# Patient Record
Sex: MALE | Race: WHITE | NOT HISPANIC OR LATINO | Employment: OTHER | URBAN - METROPOLITAN AREA
[De-identification: names, ages, dates, MRNs, and addresses within clinical notes are randomized per-mention and may not be internally consistent; named-entity substitution may affect disease eponyms.]

---

## 2021-05-14 ENCOUNTER — NEW PATIENT COMPREHENSIVE (OUTPATIENT)
Dept: URBAN - METROPOLITAN AREA CLINIC 53 | Facility: CLINIC | Age: 71
End: 2021-05-14

## 2021-05-14 DIAGNOSIS — H02.831: ICD-10-CM

## 2021-05-14 DIAGNOSIS — H25.13: ICD-10-CM

## 2021-05-14 DIAGNOSIS — H02.834: ICD-10-CM

## 2021-05-14 DIAGNOSIS — H52.4: ICD-10-CM

## 2021-05-14 DIAGNOSIS — H35.371: ICD-10-CM

## 2021-05-14 DIAGNOSIS — H43.12: ICD-10-CM

## 2021-05-14 DIAGNOSIS — H43.812: ICD-10-CM

## 2021-05-14 PROCEDURE — 92015 DETERMINE REFRACTIVE STATE: CPT

## 2021-05-14 PROCEDURE — 92004 COMPRE OPH EXAM NEW PT 1/>: CPT

## 2021-05-14 ASSESSMENT — KERATOMETRY
OD_K2POWER_DIOPTERS: 42.25
OD_K1POWER_DIOPTERS: 43.00
OS_K1POWER_DIOPTERS: 43.00
OS_AXISANGLE2_DEGREES: 178
OS_AXISANGLE_DEGREES: 88
OD_AXISANGLE_DEGREES: 75
OS_K2POWER_DIOPTERS: 42.25
OD_AXISANGLE2_DEGREES: 165

## 2021-05-14 ASSESSMENT — VISUAL ACUITY
OS_CC: 20/25-1
OU_CC: J1+ @ 16"
OD_SC: 20/150
OS_SC: 20/200
OD_CC: 20/25-2

## 2021-05-14 ASSESSMENT — TONOMETRY
OS_IOP_MMHG: 18
OD_IOP_MMHG: 18

## 2021-05-14 NOTE — PATIENT DISCUSSION
Posterior vitreous detachment diagnosis was discussed with the patient. Scleral depression performed today. No retinal tears or holes were seen and this was explained to the patient. Retinal detachment warning signs including more floaters, flashing lights, or a curtain coming over their field of vision were told to the patient.  Will refer patient over to [facility] for further evaluation. [facility]

## 2021-05-14 NOTE — PATIENT DISCUSSION
The patient has a small vitreous hemorrhage. I am referring the patient to [facility] for further evaluation.

## 2022-05-12 ENCOUNTER — COMPREHENSIVE EXAM (OUTPATIENT)
Dept: URBAN - METROPOLITAN AREA CLINIC 50 | Facility: CLINIC | Age: 72
End: 2022-05-12

## 2022-05-12 DIAGNOSIS — H35.371: ICD-10-CM

## 2022-05-12 DIAGNOSIS — H52.4: ICD-10-CM

## 2022-05-12 DIAGNOSIS — H43.392: ICD-10-CM

## 2022-05-12 PROCEDURE — 92134 CPTRZ OPH DX IMG PST SGM RTA: CPT

## 2022-05-12 PROCEDURE — 92014 COMPRE OPH EXAM EST PT 1/>: CPT

## 2022-05-12 PROCEDURE — 92015 DETERMINE REFRACTIVE STATE: CPT

## 2022-05-12 ASSESSMENT — VISUAL ACUITY
OD_CC: J1+
OU_CC: 20/20
OU_CC: J1+
OS_GLARE: 20/30
OS_GLARE: 20/25
OD_CC: 20/20
OD_GLARE: 20/25
OD_GLARE: 20/20
OS_CC: J1+
OS_CC: 20/25

## 2022-05-12 ASSESSMENT — KERATOMETRY
OS_K2POWER_DIOPTERS: 42.25
OD_AXISANGLE2_DEGREES: 165
OS_K1POWER_DIOPTERS: 43.00
OS_AXISANGLE2_DEGREES: 178
OD_K2POWER_DIOPTERS: 42.25
OS_AXISANGLE_DEGREES: 88
OD_AXISANGLE_DEGREES: 75
OD_K1POWER_DIOPTERS: 43.00

## 2022-05-12 ASSESSMENT — TONOMETRY
OD_IOP_MMHG: 15
OS_IOP_MMHG: 15

## 2023-06-08 ENCOUNTER — COMPREHENSIVE EXAM (OUTPATIENT)
Dept: URBAN - METROPOLITAN AREA CLINIC 50 | Facility: CLINIC | Age: 73
End: 2023-06-08

## 2023-06-08 DIAGNOSIS — H02.831: ICD-10-CM

## 2023-06-08 DIAGNOSIS — H43.392: ICD-10-CM

## 2023-06-08 DIAGNOSIS — H35.371: ICD-10-CM

## 2023-06-08 DIAGNOSIS — H02.834: ICD-10-CM

## 2023-06-08 DIAGNOSIS — H43.812: ICD-10-CM

## 2023-06-08 DIAGNOSIS — H25.13: ICD-10-CM

## 2023-06-08 PROCEDURE — 92134 CPTRZ OPH DX IMG PST SGM RTA: CPT

## 2023-06-08 PROCEDURE — 92014 COMPRE OPH EXAM EST PT 1/>: CPT

## 2023-06-08 ASSESSMENT — VISUAL ACUITY
OD_GLARE: 20/25
OU_CC: J1+
OU_CC: 20/20-1
OS_GLARE: 20/25
OD_GLARE: 20/25
OD_CC: 20/20-1
OS_CC: 20/20-1
OS_GLARE: 20/25

## 2023-06-08 ASSESSMENT — KERATOMETRY
OS_AXISANGLE_DEGREES: 88
OD_AXISANGLE_DEGREES: 168
OS_K1POWER_DIOPTERS: 43.00
OD_K2POWER_DIOPTERS: 43.25
OS_K1POWER_DIOPTERS: 42.50
OD_K2POWER_DIOPTERS: 42.25
OD_K1POWER_DIOPTERS: 42.25
OS_AXISANGLE2_DEGREES: 86
OS_K2POWER_DIOPTERS: 42.25
OS_AXISANGLE_DEGREES: 176
OS_K2POWER_DIOPTERS: 43.00
OD_AXISANGLE2_DEGREES: 165
OS_AXISANGLE2_DEGREES: 178
OD_K1POWER_DIOPTERS: 43.00
OD_AXISANGLE_DEGREES: 75
OD_AXISANGLE2_DEGREES: 78

## 2023-06-08 ASSESSMENT — TONOMETRY
OS_IOP_MMHG: 12
OD_IOP_MMHG: 12

## 2024-07-17 ENCOUNTER — COMPREHENSIVE EXAM (OUTPATIENT)
Dept: URBAN - METROPOLITAN AREA CLINIC 50 | Facility: LOCATION | Age: 74
End: 2024-07-17

## 2024-07-17 DIAGNOSIS — H25.13: ICD-10-CM

## 2024-07-17 DIAGNOSIS — H02.831: ICD-10-CM

## 2024-07-17 DIAGNOSIS — H35.371: ICD-10-CM

## 2024-07-17 DIAGNOSIS — H52.4: ICD-10-CM

## 2024-07-17 DIAGNOSIS — H43.813: ICD-10-CM

## 2024-07-17 DIAGNOSIS — H02.834: ICD-10-CM

## 2024-07-17 PROCEDURE — 92134 CPTRZ OPH DX IMG PST SGM RTA: CPT

## 2024-07-17 PROCEDURE — 99214 OFFICE O/P EST MOD 30 MIN: CPT

## 2024-07-17 PROCEDURE — 92015 DETERMINE REFRACTIVE STATE: CPT

## 2024-07-17 ASSESSMENT — TONOMETRY
OS_IOP_MMHG: 14
OD_IOP_MMHG: 14

## 2024-07-17 ASSESSMENT — VISUAL ACUITY
OD_GLARE: 20/25
OS_GLARE: 20/25
OD_GLARE: 20/20
OU_CC: J1+@16"
OU_CC: 20/25-1
OS_GLARE: 20/40